# Patient Record
Sex: FEMALE | Race: WHITE | URBAN - NONMETROPOLITAN AREA
[De-identification: names, ages, dates, MRNs, and addresses within clinical notes are randomized per-mention and may not be internally consistent; named-entity substitution may affect disease eponyms.]

---

## 2017-07-25 ENCOUNTER — APPOINTMENT (OUTPATIENT)
Dept: ANESTHESIOLOGY | Facility: HOSPITAL | Age: 77
End: 2017-07-25
Attending: OPHTHALMOLOGY
Payer: MEDICARE

## 2017-07-25 PROCEDURE — 99100 ANES PT EXTEME AGE<1 YR&>70: CPT | Performed by: NURSE ANESTHETIST, CERTIFIED REGISTERED

## 2017-07-25 PROCEDURE — 00142 ANES PX ON EYE LENS SURGERY: CPT | Mod: QZ | Performed by: NURSE ANESTHETIST, CERTIFIED REGISTERED

## 2017-08-08 ENCOUNTER — APPOINTMENT (OUTPATIENT)
Dept: ANESTHESIOLOGY | Facility: HOSPITAL | Age: 77
End: 2017-08-08
Attending: OPHTHALMOLOGY
Payer: MEDICARE

## 2017-08-08 PROCEDURE — 00142 ANES PX ON EYE LENS SURGERY: CPT | Mod: QZ | Performed by: NURSE ANESTHETIST, CERTIFIED REGISTERED

## 2017-08-08 PROCEDURE — 99100 ANES PT EXTEME AGE<1 YR&>70: CPT | Performed by: NURSE ANESTHETIST, CERTIFIED REGISTERED

## 2018-07-18 ENCOUNTER — HOSPITAL ENCOUNTER (INPATIENT)
Facility: CLINIC | Age: 78
Setting detail: SURGERY ADMIT
End: 2018-07-18
Attending: ORTHOPAEDIC SURGERY | Admitting: ORTHOPAEDIC SURGERY

## 2025-04-01 DIAGNOSIS — N81.10 FEMALE BLADDER PROLAPSE: Primary | ICD-10-CM

## 2025-04-07 ENCOUNTER — TELEPHONE (OUTPATIENT)
Dept: UROLOGY | Facility: OTHER | Age: 85
End: 2025-04-07

## 2025-04-07 NOTE — TELEPHONE ENCOUNTER
Called PT to cancel appointment in urology and ask for her to schedule with OB/GYN, her diagnosis in her referral is not urology related but OB/GYN appropriate. Advised her to call back with any questions and to call her provider for referral to OB/GYN. Gave call back number    Thank you,     Kelli ESPINOZA, BSN, PHN  RN Care Coordinator Urology  [facility]

## 2025-04-29 ENCOUNTER — OFFICE VISIT (OUTPATIENT)
Dept: OBGYN | Facility: OTHER | Age: 85
End: 2025-04-29
Attending: OBSTETRICS & GYNECOLOGY
Payer: MEDICARE

## 2025-04-29 VITALS
SYSTOLIC BLOOD PRESSURE: 132 MMHG | DIASTOLIC BLOOD PRESSURE: 80 MMHG | HEART RATE: 84 BPM | OXYGEN SATURATION: 98 % | WEIGHT: 162.9 LBS

## 2025-04-29 DIAGNOSIS — N39.46 MIXED INCONTINENCE URGE AND STRESS (MALE)(FEMALE): ICD-10-CM

## 2025-04-29 DIAGNOSIS — N99.3 VAGINAL VAULT PROLAPSE AFTER HYSTERECTOMY: Primary | ICD-10-CM

## 2025-04-29 LAB
ALBUMIN UR-MCNC: NEGATIVE MG/DL
APPEARANCE UR: CLEAR
BILIRUB UR QL STRIP: NEGATIVE
COLOR UR AUTO: ABNORMAL
GLUCOSE UR STRIP-MCNC: NEGATIVE MG/DL
HGB UR QL STRIP: NEGATIVE
KETONES UR STRIP-MCNC: NEGATIVE MG/DL
LEUKOCYTE ESTERASE UR QL STRIP: NEGATIVE
MUCOUS THREADS #/AREA URNS LPF: PRESENT /LPF
NITRATE UR QL: NEGATIVE
PH UR STRIP: 7 [PH] (ref 4.7–8)
RBC URINE: <1 /HPF
SP GR UR STRIP: 1.01 (ref 1–1.03)
SQUAMOUS EPITHELIAL: 7 /HPF
UROBILINOGEN UR STRIP-MCNC: NORMAL MG/DL
WBC URINE: <1 /HPF

## 2025-04-29 PROCEDURE — 57160 INSERT PESSARY/OTHER DEVICE: CPT | Performed by: OBSTETRICS & GYNECOLOGY

## 2025-04-29 PROCEDURE — 81001 URINALYSIS AUTO W/SCOPE: CPT | Mod: ZL | Performed by: OBSTETRICS & GYNECOLOGY

## 2025-04-29 PROCEDURE — G0463 HOSPITAL OUTPT CLINIC VISIT: HCPCS | Mod: 25

## 2025-04-29 RX ORDER — MULTIVIT-MIN/IRON/FOLIC ACID/K 18-600-40
50 CAPSULE ORAL DAILY
COMMUNITY

## 2025-04-29 RX ORDER — MAGNESIUM 30 MG
30 TABLET ORAL DAILY
COMMUNITY

## 2025-04-29 RX ORDER — LOSARTAN POTASSIUM 50 MG/1
50 TABLET ORAL DAILY
COMMUNITY

## 2025-04-29 RX ORDER — HYDROCORTISONE 25 MG/G
CREAM TOPICAL DAILY PRN
COMMUNITY
Start: 2023-09-08

## 2025-04-30 NOTE — PROGRESS NOTES
CC:  Consult from ISSA Aggarwal NP for Pelvic organ prolapse.     HPI:  Sherrie Silva is a 84 year old female P3 ().  She has 2-3 yr h/o bulging at the vaginal worsening over last 6 months.  She has a distant h/o  vaginal hysterectomy, A-P repairs.  Outside records reviewed.          Frequency: Yes  Urgency:  Yes  Stress:  Yes  Nocturia:  Yes  Previous work-up:No  Pelvic pain:No  Dyspareunia: Does not know  Incomplete emptying:  :Yes  Sexually active:  No  Pelvic pressure:  Yes  Dysuria: No  Bulging:  Yes  Splinting: Yes  Constipation: YEs    Past GYN history:        Last PAP smear:  Normal  Hysterectomy: Yes    Patients records are available and reviewed at today's visit.    Patient Active Problem List   Diagnosis   Essential hypertension   Macular degeneration   Brachial plexus injury, left   Closed anterior dislocation of humerus   Osteopenia   Traumatic closed fx of distal end of radius with minimal displacement, left, with routine healing, subsequent encounter   Ruptured extensor tendon of hand or wrist     No family history on file.    Current Outpatient Medications   Medication Sig Dispense Refill    hydrocortisone, Perianal, (ANUSOL-HC) 2.5 % cream Place rectally daily as needed.      losartan (COZAAR) 50 MG tablet Take 50 mg by mouth daily.      magnesium 30 MG tablet Take 30 mg by mouth daily.      Vitamin D, Cholecalciferol, 50 MCG (2000 UT) CAPS Take 50 mcg by mouth daily.         Allergies: Patient has no known allergies.    ROS:  CONSTITUTIONAL: NEGATIVE for fever, chills, change in weight  GI: NEGATIVE except for above  : As Above  PSYCHIATRIC: NEGATIVE for changes in mood or affect    EXAM:  Blood pressure 132/80, pulse 84, weight 73.9 kg (162 lb 14.4 oz), SpO2 98%.   BMI= There is no height or weight on file to calculate BMI.  General - pleasant female in no acute distress.  Abdomen - soft, nontender, nondistended, no hepatosplenomegaly.  Pelvic - EG: normal adult female, BUS: within normal  limits, Vagina: mildly atrophic, no discharge, Cervix: absent.   Adnexae: no masses or tenderness.  Prolapse:  Vaginal vault grade 4, cystocele 1, rectocele 1-2   Urethral hypermobility:  No    Rectovaginal - deferred.  Musculoskeletal - no gross deformities or edema  Neurological - normal mental status.    Supine stress test:  negative  Post-void residual 300cc      ASSESSMENT/PLAN:  Urinary incontinence/retention.   Plan: UA Macroscopic with reflex to Microscopic and         Culture          Symptomatic vaginal vault prolapse(post-hysterectomy)     Discussed expectant, pessary, and surgical options with pt.  Risks and benefits of each. The natural h/o POP discussed.  Handouts were reviewed with patient. After our discussion she was desired to proceed with pessary fitting and was successfully fitted with #2.75 ring pessary with support.  This alleviated her symptoms and was able to then void and completely empty her bladder (PVR 42 after pessary placement) Patient has my card and phone number if questions. F/up in 3 months for pessary check.   55 minutes were spent on the day of the encounter, outside of a procedure,  doing face-to-face counseling, review or records, charting,  and coordination of care

## 2025-05-01 ENCOUNTER — OFFICE VISIT (OUTPATIENT)
Dept: OBGYN | Facility: OTHER | Age: 85
End: 2025-05-01
Attending: OBSTETRICS & GYNECOLOGY
Payer: MEDICARE

## 2025-05-01 DIAGNOSIS — Z46.89 ENCOUNTER FOR FITTING AND ADJUSTMENT OF PESSARY: Primary | ICD-10-CM

## 2025-05-01 PROCEDURE — G0463 HOSPITAL OUTPT CLINIC VISIT: HCPCS | Mod: 25

## 2025-05-01 PROCEDURE — 57160 INSERT PESSARY/OTHER DEVICE: CPT

## 2025-05-01 PROCEDURE — 57160 INSERT PESSARY/OTHER DEVICE: CPT | Performed by: OBSTETRICS & GYNECOLOGY

## 2025-05-03 NOTE — PROGRESS NOTES
CC:  pessary f/up  HPI:  Sherrie Silva is a 84 year old female    Ring  pessary was placed 3 days ago but fell out with BM.   No vaginal bleeding or pain noted.     Allergies: Patient has no known allergies.  No past medical history on file.    No past surgical history on file.      ROS:  as per HPI  Neg GI/        EXAM:  There were no vitals taken for this visit.  General - pleasant female in no acute distress.  Pelvic - EG: normal adult female, BUS: within normal limits, Vagina: slightly atrophic, no discharge, vaginal walls all WNL, no lesions seen.   Rectovaginal - deferred.  Psychiactric - appropriate mood and affect  Neurological - alert and oriented X 3    Pt refitted with #2.75 Gellhorn pessary.       ASSESSMENT/PLAN:  Pelvic organ prolapse.  Pessary refitting      F/U 3 months or sooner prn if problems.    Bebeto Pulido MD

## 2025-05-08 ENCOUNTER — TELEPHONE (OUTPATIENT)
Dept: OBGYN | Facility: OTHER | Age: 85
End: 2025-05-08

## 2025-05-08 ENCOUNTER — OFFICE VISIT (OUTPATIENT)
Dept: OBGYN | Facility: OTHER | Age: 85
End: 2025-05-08
Attending: OBSTETRICS & GYNECOLOGY
Payer: MEDICARE

## 2025-05-08 DIAGNOSIS — Z46.89 ENCOUNTER FOR FITTING AND ADJUSTMENT OF PESSARY: Primary | ICD-10-CM

## 2025-05-08 DIAGNOSIS — N99.3 VAGINAL VAULT PROLAPSE AFTER HYSTERECTOMY: ICD-10-CM

## 2025-05-08 PROCEDURE — G0463 HOSPITAL OUTPT CLINIC VISIT: HCPCS

## 2025-05-08 NOTE — TELEPHONE ENCOUNTER
Patient called and says she feels like the pessary dropped down and now she is having a hard time have a bowel movement. She is wondering what she should do?

## 2025-05-08 NOTE — PROGRESS NOTES
CC:  pessary check    HPI:  Sherrie Silva is a 84 year old female    Gellhorn pessary is in place. Complains that it has dropped down and difficulty having BM.    No vaginal bleeding or pain noted. See my prior eval.  Pt previously with ring pessary for VPP which fell out.      Allergies: Patient has no known allergies.  No past medical history on file.    No past surgical history on file.      ROS:  as per HPI  Neg GI/        EXAM:  There were no vitals taken for this visit.  General - pleasant female in no acute distress.  Pelvic - EG: normal adult female, BUS: within normal limits, Vagina: slightly atrophic, no discharge, vaginal walls all WNL, no lesions seen.   Rectovaginal - deferred.  Psychiactric - appropriate mood and affect  Neurological - alert and oriented X 3    pessary was removed, cleaned and reinserted.        ASSESSMENT/PLAN:  Pelvic organ prolapse.           Satisfactory  pessary check for treatment of pelvic organ prolapse. However, pt still symptomatic.  We discussed alternative(short stem Gellhorn) pessary, current pessary or surgical Tx.  Pt wishes to retry current pessary and is considering surgical tx . Urogyn referral  initiated(Dr. Clint Willis) for consideration of surgical tx for VVP.  Otherwise  F/U 3 months for pessary check  or sooner prn if problems.    Bebeto Pulido MD

## 2025-06-24 ENCOUNTER — OFFICE VISIT (OUTPATIENT)
Dept: OBGYN | Facility: OTHER | Age: 85
End: 2025-06-24
Attending: OBSTETRICS & GYNECOLOGY
Payer: MEDICARE

## 2025-06-24 VITALS — DIASTOLIC BLOOD PRESSURE: 84 MMHG | SYSTOLIC BLOOD PRESSURE: 122 MMHG | HEART RATE: 83 BPM | OXYGEN SATURATION: 95 %

## 2025-06-24 DIAGNOSIS — Z46.89 PESSARY MAINTENANCE: ICD-10-CM

## 2025-06-24 DIAGNOSIS — N99.3 VAGINAL VAULT PROLAPSE AFTER HYSTERECTOMY: Primary | ICD-10-CM

## 2025-06-24 PROCEDURE — G0463 HOSPITAL OUTPT CLINIC VISIT: HCPCS | Mod: 25

## 2025-06-24 ASSESSMENT — PAIN SCALES - GENERAL: PAINLEVEL_OUTOF10: NO PAIN (0)

## 2025-06-24 NOTE — PROGRESS NOTES
CC:  pessary check  HPI:  Sherrie Silva is a 84 year old female    Gellhorn pessary is in place.   No vaginal bleeding or pain noted. Pessary does fall down but able to push it back up.  Has Urogy appt for possible surgery in [place] next month.     Allergies: Patient has no known allergies.  No past medical history on file.    No past surgical history on file.      ROS:  as per HPI  Neg GI/        EXAM:  Blood pressure 122/84, pulse 83, SpO2 95%.  General - pleasant female in no acute distress.  Pelvic - EG: normal adult female, BUS: within normal limits, Vagina: slightly atrophic, no discharge, vaginal walls all WNL, no lesions seen.   Rectovaginal - deferred.  Psychiactric - appropriate mood and affect  Neurological - alert and oriented X 3    pessary was removed, cleaned and reinserted.        ASSESSMENT/PLAN:  Pelvic organ prolapse           Satisfactory  pessary check for treatment of pelvic organ prolapse.  F/U 3 months or sooner prn if problems. If does not have surgery can try a short pessary knob Gellhorn pessary which we do not have in clinic currently and will have to order.     Bebeto Pulido MD

## (undated) RX ORDER — ESTRADIOL 0.1 MG/G
CREAM VAGINAL
Status: DISPENSED
Start: 2025-06-24

## (undated) RX ORDER — ESTRADIOL 0.1 MG/G
CREAM VAGINAL
Status: DISPENSED
Start: 2025-05-08